# Patient Record
Sex: FEMALE | Race: BLACK OR AFRICAN AMERICAN | NOT HISPANIC OR LATINO | URBAN - METROPOLITAN AREA
[De-identification: names, ages, dates, MRNs, and addresses within clinical notes are randomized per-mention and may not be internally consistent; named-entity substitution may affect disease eponyms.]

---

## 2024-03-21 ENCOUNTER — TELEPHONE (OUTPATIENT)
Dept: ORTHOPEDICS | Facility: CLINIC | Age: 54
End: 2024-03-21
Payer: COMMERCIAL

## 2024-03-22 NOTE — TELEPHONE ENCOUNTER
DIAGNOSIS: Mass of right side of neck R22.1 US Neck/Head Soft Tissue Not Thyroid   APPOINTMENT DATE: 03/25/2024   NOTES STATUS DETAILS   OFFICE NOTE from referring provider Care Everywhere 03/11/2024 - Maryanne Velásquez  Physician Assistant -    ULTRASOUND PACS HP:  03/15/2024-  Head/Neck Soft Tissue   MRI PACS HP:  03/18/2024 - Chest

## 2024-03-22 NOTE — PROGRESS NOTES
[facility] Physicians, Orthopaedic Oncology Surgery Consultation  by Ubaldo Dominguez M.D.    Vilma Mendoza MRN# [id number]    YOB: 1970     Requesting physician: Maryanne Velásquez  [facility]            Assessment and Plan:   Assessment:  Mass deep to trapezius muscle on the right shoulder at the base of neck and apex of the thorax measuring 5 x 4 x 3 cm.  Differential diagnosis would include soft tissue hemangioma or vascular lesion or possibly sarcoma.  It does not have the appearance of a schwannoma or lipoma or desmoid.     Plan:  Advised proceeding with core needle biopsy.    Procedure note:  Under sterile precautions using ultrasound guidance, the mass was identified.  Color Doppler was used to assess vascularity.  Incision made through the trapezius and a 14-gauge biopsy instrument was passed into the mass multiple times to obtain core tissue samples which were sent to pathology.  There were no complications.    Ubaldo Dominguez MD MaFormerly [facility], [facility] Family Professor  Oncology and Adult Reconstructive Surgery  Dept Orthopaedic Surgery, [facility]  [phone] office, [phone] pager  [email or web address]             History of Present Illness:   54 year old female  chief complaint    This patient noted the presence of a mass involving her right neck and shoulder region for approximately 3 years and sought medical evaluation.  It does vary in size on occasion.  She has some discomfort but it does not awaken her from sleep.  No prior surgery or injury to the area.  She is otherwise healthy.    Current symptoms:  Problem: Right shoulder mass   Onset and duration: States it has been there for a really long time and thought it was a knot. About 3 weeks ago she found it was a mass   Awakens from sleep due to sx's:  No  Precipitating Injury:  No    Other joints or sites painful:  Yes, She cut her finger a few weeks ago   Fever: No  Appetite change or weight loss: No  History of  "prior or existing cancer: No                 Physical Exam:     EXAMINATION pertinent findings:   PSYCH: Pleasant, healthy-appearing, alert, oriented x3, cooperative. Normal mood and affect.  VITAL SIGNS: Height 1.626 m (5' 4.02\"), weight 116.6 kg (257 lb)..  Reviewed nursing intake notes.   Body mass index is 44.09 kg/m .  RESP: non labored breathing   ABD: benign, soft, non-tender, no acute peritoneal findings  SKIN: grossly normal   LYMPHATIC: grossly normal, no adenopathy, no extremity edema  NEURO: grossly normal , no motor deficits  VASCULAR: satisfactory perfusion of all extremities   MUSCULOSKELETAL:   Full range of motion possible of the right glenohumeral joint.  No radicular symptoms within the right upper extremity or neural logic or muscular dysfunction.  She has palpable mass deep to the trapezius muscle at the base of the neck, it is mobile.  Firm.  Nonpulsatile.  Negative Tinel's sign.           Data:   All laboratory data reviewed  All imaging studies reviewed by me          MRI:  03/19/2024 1:43 PM CDT   COMPARISON: 03/15/2024 ultrasound    TECHNIQUE: Multiplanar multisequence MRI of the right superior chest walls and right base of neck performed after the uncomplicated administration of 11 mL intravenous Gadavist.    FINDINGS: Study is optimized to evaluate soft tissue masses. Markers were placed by the patient overlying the region of interest and can be seen on axial images 16 through 19.    In the region of interest in the soft tissues overlying the right lung apex is a relatively well-circumscribed lobulated mass of intermediate T1, heterogeneously increased T2 signal with mild irregular internal enhancement measuring approximately 5.5 x 3.1 cm axially by 4.0 cm superior to inferior. Mass appears intramuscular within the levator scapulae muscle overlying the superior and posterior superior aspects of the right first and second ribs. Mass is in close proximity to some of the branches of the " brachial plexus. The visualized brachial plexus is otherwise unremarkable. There is marginating fat signal surrounding the mass with possible intrasubstance extension of fatty signal particularly along the superior margin of the mass. Mass is deep to the trapezius muscle. Mass does not appear to be infiltrative or show other invasion of other adjacent structures. No definite intrathoracic extension is identified. No marginating inflammatory changes. Visualized osseous structures are  unremarkable. No visualized marginating lymphadenopathy. Remainder of the visualized soft tissues are unremarkable. No other similar masses are identified.    IMPRESSION:  1. 5.5 x 3.1 x 4.0 cm heterogeneous lobulated intramuscular enhancing mass in the base of the right neck overlying the right lung apex is nonspecific and may be seen with either benign or malignant masses. Mass may represent a slow flow vascular malformation or intramuscular hemangioma. A neurogenic mass such as a peripheral nerve sheath tumor or plexiform neurofibroma is felt to be less likely but cannot be excluded. Orthopedic oncology surgical referral is recommended for possible surgical excision.         DATA for DOCUMENTATION:         Past Medical History:   There is no problem list on file for this patient.    No past medical history on file.    Also see scanned health assessment forms.       Past Surgical History:   No past surgical history on file.         Social History:     Social History     Socioeconomic History     Marital status:      Spouse name: Not on file     Number of children: Not on file     Years of education: Not on file     Highest education level: Not on file   Occupational History     Not on file   Tobacco Use     Smoking status: Not on file     Smokeless tobacco: Not on file   Substance and Sexual Activity     Alcohol use: Not on file     Drug use: Not on file     Sexual activity: Not on file   Other Topics Concern     Not on file    Social History Narrative     Not on file     Social Determinants of Health     Financial Resource Strain: Not on file   Food Insecurity: Not on file   Transportation Needs: Not on file   Physical Activity: Not on file   Stress: Not on file   Social Connections: Not on file   Interpersonal Safety: Not on file   Housing Stability: Not on file            Family History:     No family history on file.         Medications:     Current Outpatient Medications   Medication Sig     phentermine (ADIPEX-P) 37.5 MG tablet TAKE 0.5-1 TABLETS (18.75-37.5 MG) BY MOUTH DAILY.     ZEPBOUND 2.5 MG/0.5ML prefilled pen Inject 2.5 mg Subcutaneous     No current facility-administered medications for this visit.              Review of Systems:   A comprehensive 10 point review of systems (constitutional, ENT, cardiac, peripheral vascular, lymphatic, respiratory, GI, , Musculoskeletal, skin, Neurological) was performed and found to be negative except as described in this note.     See intake form completed by patient    Under 1% lidocane topical anesthesia, a Core Needle Biopsy of the above mentioned mass was sampled.  There were no complications. A sterile dressing was applied.

## 2024-03-25 ENCOUNTER — OFFICE VISIT (OUTPATIENT)
Dept: ORTHOPEDICS | Facility: CLINIC | Age: 54
End: 2024-03-25
Payer: COMMERCIAL

## 2024-03-25 ENCOUNTER — PRE VISIT (OUTPATIENT)
Dept: ORTHOPEDICS | Facility: CLINIC | Age: 54
End: 2024-03-25

## 2024-03-25 VITALS — WEIGHT: 257 LBS | BODY MASS INDEX: 43.87 KG/M2 | HEIGHT: 64 IN

## 2024-03-25 DIAGNOSIS — R22.9 LOCALIZED SWELLING, MASS AND LUMP, UNSPECIFIED: ICD-10-CM

## 2024-03-25 DIAGNOSIS — M79.89 SOFT TISSUE MASS: Primary | ICD-10-CM

## 2024-03-25 PROCEDURE — 88307 TISSUE EXAM BY PATHOLOGIST: CPT | Mod: 26 | Performed by: PATHOLOGY

## 2024-03-25 PROCEDURE — 99204 OFFICE O/P NEW MOD 45 MIN: CPT | Mod: 25 | Performed by: ORTHOPAEDIC SURGERY

## 2024-03-25 PROCEDURE — 20206 BIOPSY MUSCLE PERQ NEEDLE: CPT | Mod: RT | Performed by: ORTHOPAEDIC SURGERY

## 2024-03-25 PROCEDURE — 88307 TISSUE EXAM BY PATHOLOGIST: CPT | Mod: TC | Performed by: ORTHOPAEDIC SURGERY

## 2024-03-25 RX ORDER — PHENTERMINE HYDROCHLORIDE 37.5 MG/1
TABLET ORAL
COMMUNITY
Start: 2024-01-17

## 2024-03-25 RX ORDER — TIRZEPATIDE 2.5 MG/.5ML
2.5 INJECTION, SOLUTION SUBCUTANEOUS
COMMUNITY
Start: 2024-03-01

## 2024-03-25 NOTE — LETTER
3/25/2024         RE: Vilma Mendoza  [address]  [place] [address]        Dear Colleague,    Thank you for referring your patient, Vilma Mendoza, to the [facility] [place]. Please see a copy of my visit note below.        [facility] Physicians, Orthopaedic Oncology Surgery Consultation  by Ubaldo Dominguez M.D.    Vilma Mendoza MRN# [id number]    YOB: 1970     Requesting physician: Maryanne Velásquez  [facility], [facility]            Assessment and Plan:   Assessment:  Mass deep to trapezius muscle on the right shoulder at the base of neck and apex of the thorax measuring 5 x 4 x 3 cm.  Differential diagnosis would include soft tissue hemangioma or vascular lesion or possibly sarcoma.  It does not have the appearance of a schwannoma or lipoma or desmoid.     Plan:  Advised proceeding with core needle biopsy.    Procedure note:  Under sterile precautions using ultrasound guidance, the mass was identified.  Color Doppler was used to assess vascularity.  Incision made through the trapezius and a 14-gauge biopsy instrument was passed into the mass multiple times to obtain core tissue samples which were sent to pathology.  There were no complications.    Ubaldo Dominguez MD MaFormerly [facility] Family Professor  Oncology and Adult Reconstructive Surgery  Dept Orthopaedic Surgery, [facility]  [phone] office, [phone] pager  [email or web address]             History of Present Illness:   54 year old female  chief complaint    This patient noted the presence of a mass involving her right neck and shoulder region for approximately 3 years and sought medical evaluation.  It does vary in size on occasion.  She has some discomfort but it does not awaken her from sleep.  No prior surgery or injury to the area.  She is otherwise healthy.    Current symptoms:  Problem: Right shoulder mass   Onset and duration: States it has been there for a really long time and  "thought it was a knot. About 3 weeks ago she found it was a mass   Awakens from sleep due to sx's:  No  Precipitating Injury:  No    Other joints or sites painful:  Yes, She cut her finger a few weeks ago   Fever: No  Appetite change or weight loss: No  History of prior or existing cancer: No                 Physical Exam:     EXAMINATION pertinent findings:   PSYCH: Pleasant, healthy-appearing, alert, oriented x3, cooperative. Normal mood and affect.  VITAL SIGNS: Height 1.626 m (5' 4.02\"), weight 116.6 kg (257 lb)..  Reviewed nursing intake notes.   Body mass index is 44.09 kg/m .  RESP: non labored breathing   ABD: benign, soft, non-tender, no acute peritoneal findings  SKIN: grossly normal   LYMPHATIC: grossly normal, no adenopathy, no extremity edema  NEURO: grossly normal , no motor deficits  VASCULAR: satisfactory perfusion of all extremities   MUSCULOSKELETAL:   Full range of motion possible of the right glenohumeral joint.  No radicular symptoms within the right upper extremity or neural logic or muscular dysfunction.  She has palpable mass deep to the trapezius muscle at the base of the neck, it is mobile.  Firm.  Nonpulsatile.  Negative Tinel's sign.           Data:   All laboratory data reviewed  All imaging studies reviewed by me          MRI:  03/19/2024 1:43 PM CDT   COMPARISON: 03/15/2024 ultrasound    TECHNIQUE: Multiplanar multisequence MRI of the right superior chest walls and right base of neck performed after the uncomplicated administration of 11 mL intravenous Gadavist.    FINDINGS: Study is optimized to evaluate soft tissue masses. Markers were placed by the patient overlying the region of interest and can be seen on axial images 16 through 19.    In the region of interest in the soft tissues overlying the right lung apex is a relatively well-circumscribed lobulated mass of intermediate T1, heterogeneously increased T2 signal with mild irregular internal enhancement measuring approximately " 5.5 x 3.1 cm axially by 4.0 cm superior to inferior. Mass appears intramuscular within the levator scapulae muscle overlying the superior and posterior superior aspects of the right first and second ribs. Mass is in close proximity to some of the branches of the brachial plexus. The visualized brachial plexus is otherwise unremarkable. There is marginating fat signal surrounding the mass with possible intrasubstance extension of fatty signal particularly along the superior margin of the mass. Mass is deep to the trapezius muscle. Mass does not appear to be infiltrative or show other invasion of other adjacent structures. No definite intrathoracic extension is identified. No marginating inflammatory changes. Visualized osseous structures are  unremarkable. No visualized marginating lymphadenopathy. Remainder of the visualized soft tissues are unremarkable. No other similar masses are identified.    IMPRESSION:  1. 5.5 x 3.1 x 4.0 cm heterogeneous lobulated intramuscular enhancing mass in the base of the right neck overlying the right lung apex is nonspecific and may be seen with either benign or malignant masses. Mass may represent a slow flow vascular malformation or intramuscular hemangioma. A neurogenic mass such as a peripheral nerve sheath tumor or plexiform neurofibroma is felt to be less likely but cannot be excluded. Orthopedic oncology surgical referral is recommended for possible surgical excision.         DATA for DOCUMENTATION:         Past Medical History:   There is no problem list on file for this patient.    No past medical history on file.    Also see scanned health assessment forms.       Past Surgical History:   No past surgical history on file.         Social History:     Social History     Socioeconomic History    Marital status:      Spouse name: Not on file    Number of children: Not on file    Years of education: Not on file    Highest education level: Not on file   Occupational History     Not on file   Tobacco Use    Smoking status: Not on file    Smokeless tobacco: Not on file   Substance and Sexual Activity    Alcohol use: Not on file    Drug use: Not on file    Sexual activity: Not on file   Other Topics Concern    Not on file   Social History Narrative    Not on file     Social Determinants of Health     Financial Resource Strain: Not on file   Food Insecurity: Not on file   Transportation Needs: Not on file   Physical Activity: Not on file   Stress: Not on file   Social Connections: Not on file   Interpersonal Safety: Not on file   Housing Stability: Not on file            Family History:     No family history on file.         Medications:     Current Outpatient Medications   Medication Sig    phentermine (ADIPEX-P) 37.5 MG tablet TAKE 0.5-1 TABLETS (18.75-37.5 MG) BY MOUTH DAILY.    ZEPBOUND 2.5 MG/0.5ML prefilled pen Inject 2.5 mg Subcutaneous     No current facility-administered medications for this visit.              Review of Systems:   A comprehensive 10 point review of systems (constitutional, ENT, cardiac, peripheral vascular, lymphatic, respiratory, GI, , Musculoskeletal, skin, Neurological) was performed and found to be negative except as described in this note.     See intake form completed by patient    Under 1% lidocane topical anesthesia, a Core Needle Biopsy of the above mentioned mass was sampled.  There were no complications. A sterile dressing was applied.

## 2024-03-26 LAB
PATH REPORT.COMMENTS IMP SPEC: NORMAL
PATH REPORT.COMMENTS IMP SPEC: NORMAL
PATH REPORT.FINAL DX SPEC: NORMAL
PATH REPORT.GROSS SPEC: NORMAL
PATH REPORT.MICROSCOPIC SPEC OTHER STN: NORMAL
PATH REPORT.RELEVANT HX SPEC: NORMAL
PHOTO IMAGE: NORMAL

## 2024-04-05 NOTE — PROGRESS NOTES
[facility] Physicians, Orthopaedic Oncology Surgery Consultation  by Ubaldo Dominguez M.D.    Vilma Mendoza MRN# [id number]    YOB: 1970     Requesting physician: Mayranne Velásquez  [facility], [facility]       Diagnosis:  1.  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    Procedures:  1.  3/25/2024, core needle biopsy of right shoulder/neck mass ([place]) [facility]    I reviewed the biopsy results which reveal evidence of benign met vascular malformation.  There is no evidence of malignancy present.  The likelihood of targeting error is very small.  Therefore, management would consist of either serial observation or proceeding with surgical excision.  We discussed the pros and cons and risk benefits alternatives with each treatment strategy.    My recommendation, given the patient's minimal symptoms and the anatomic location was to proceed with serial observation.  Patient does note that the mass does sometimes vary in size with activity.  Therefore I advised her to always assess the tumor size and presence at the same time repetitively, perhaps early in the morning after arising from bed.  Patient is in favor of avoiding surgical resection if possible.    Plan, return in 6 months with repeat MRI before hand.  If patient develops symptoms, progressive pain or increasing size, she will contact us for a sooner appointment.      MD Izabella Gonzalez Family Professor  Oncology and Adult Reconstructive Surgery  Dept Orthopaedic Surgery, [facility]  [phone] office, [phone] pager  [email or web address]      Total combined visit time and work time before and after clinic visit on encounter date = 20 min

## 2024-04-08 ENCOUNTER — OFFICE VISIT (OUTPATIENT)
Dept: ORTHOPEDICS | Facility: CLINIC | Age: 54
End: 2024-04-08
Payer: COMMERCIAL

## 2024-04-08 ENCOUNTER — CARE COORDINATION (OUTPATIENT)
Dept: ORTHOPEDICS | Facility: CLINIC | Age: 54
End: 2024-04-08

## 2024-04-08 DIAGNOSIS — M79.89 SOFT TISSUE MASS: Primary | ICD-10-CM

## 2024-04-08 DIAGNOSIS — R22.9 LOCALIZED SWELLING, MASS AND LUMP, UNSPECIFIED: ICD-10-CM

## 2024-04-08 DIAGNOSIS — F40.240 CLAUSTROPHOBIA: Primary | ICD-10-CM

## 2024-04-08 PROCEDURE — 99213 OFFICE O/P EST LOW 20 MIN: CPT | Performed by: ORTHOPAEDIC SURGERY

## 2024-04-08 RX ORDER — DIAZEPAM 5 MG
TABLET ORAL
Qty: 1 TABLET | Refills: 0 | Status: SHIPPED | OUTPATIENT
Start: 2024-11-06

## 2024-04-08 NOTE — LETTER
4/8/2024       RE: Vilma Mendoza  [address]  [place] [address]     Dear Colleague,    Thank you for referring your patient, Vilma Mendoza, to the [facility] [place] [facility]. Please see a copy of my visit note below.        [facility] Physicians, Orthopaedic Oncology Surgery Consultation  by Ubaldo Dominguez M.D.    Vilma Mendoza MRN# [id number]    YOB: 1970     Requesting physician: Maryanne Velásquez  [facility], [facility]       Diagnosis:  1.  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    Procedures:  1.  3/25/2024, core needle biopsy of right shoulder/neck mass ([place]) [facility]    I reviewed the biopsy results which reveal evidence of benign met vascular malformation.  There is no evidence of malignancy present.  The likelihood of targeting error is very small.  Therefore, management would consist of either serial observation or proceeding with surgical excision.  We discussed the pros and cons and risk benefits alternatives with each treatment strategy.    My recommendation, given the patient's minimal symptoms and the anatomic location was to proceed with serial observation.  Patient does note that the mass does sometimes vary in size with activity.  Therefore I advised her to always assess the tumor size and presence at the same time repetitively, perhaps early in the morning after arising from bed.  Patient is in favor of avoiding surgical resection if possible.    Plan, return in 6 months with repeat MRI before hand.  If patient develops symptoms, progressive pain or increasing size, she will contact us for a sooner appointment.      MD Izabella Gonzalez Family Professor  Oncology and Adult Reconstructive Surgery  Dept Orthopaedic Surgery, [facility]  [phone] office, [phone]  pager  [email or web address]      Total combined visit time and work time before and after clinic visit on encounter date = 20 min      Again, thank you for allowing me to participate in the care of your patient.      Sincerely,    Ubaldo Dominguez MD

## 2024-11-19 ENCOUNTER — MYC MEDICAL ADVICE (OUTPATIENT)
Dept: ORTHOPEDICS | Facility: CLINIC | Age: 54
End: 2024-11-19
Payer: COMMERCIAL

## 2024-11-19 NOTE — TELEPHONE ENCOUNTER
VM left requesting a return call to schedule MRI and follow up visit with Dr. Dominguez.    Leanna Colin LPN

## 2024-11-27 DIAGNOSIS — F40.240 CLAUSTROPHOBIA: Primary | ICD-10-CM

## 2024-11-27 RX ORDER — DIAZEPAM 5 MG/1
TABLET ORAL
Qty: 1 TABLET | Refills: 0 | Status: SHIPPED | OUTPATIENT
Start: 2024-11-27

## 2024-11-27 NOTE — TELEPHONE ENCOUNTER
[facility] Call Center    Phone Message    May a detailed message be left on voicemail: yes     Reason for Call: Other: Pt requesting a script for meds to take before her MRI as she is closterphobic. [facility] [address] [facility] [address]     Action Taken: Other: csc    Travel Screening: Not Applicable     Date of Service:

## 2024-11-30 ENCOUNTER — HEALTH MAINTENANCE LETTER (OUTPATIENT)
Age: 54
End: 2024-11-30

## 2024-12-11 ENCOUNTER — HOSPITAL ENCOUNTER (OUTPATIENT)
Dept: MRI IMAGING | Facility: CLINIC | Age: 54
Discharge: HOME | End: 2024-12-11
Attending: ORTHOPAEDIC SURGERY
Payer: COMMERCIAL

## 2024-12-11 DIAGNOSIS — M79.89 SOFT TISSUE MASS: ICD-10-CM

## 2024-12-11 DIAGNOSIS — R22.9 LOCALIZED SWELLING, MASS AND LUMP, UNSPECIFIED: ICD-10-CM

## 2024-12-11 PROCEDURE — 73223 MRI JOINT UPR EXTR W/O&W/DYE: CPT | Mod: 26 | Performed by: RADIOLOGY

## 2024-12-11 PROCEDURE — 73223 MRI JOINT UPR EXTR W/O&W/DYE: CPT | Mod: RT

## 2024-12-11 PROCEDURE — A9585 GADOBUTROL INJECTION: HCPCS | Performed by: ORTHOPAEDIC SURGERY

## 2024-12-11 PROCEDURE — 255N000002 HC RX 255 OP 636: Performed by: ORTHOPAEDIC SURGERY

## 2024-12-11 RX ORDER — GADOBUTROL 604.72 MG/ML
11 INJECTION INTRAVENOUS ONCE
Status: COMPLETED | OUTPATIENT
Start: 2024-12-11 | End: 2024-12-11

## 2024-12-11 RX ADMIN — GADOBUTROL 11 ML: 604.72 INJECTION INTRAVENOUS at 21:36

## 2024-12-19 NOTE — PROGRESS NOTES
"    [facility] Physicians, Orthopaedic Oncology Surgery Consultation  by Ubaldo Dominguez M.D.    Vilma Mendoza MRN# [id number]    YOB: 1970     Requesting physician: Maryanne Velásquez PA-C [facility]  [facility], [facility]       Diagnosis:  1.  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    Procedures:  1.  3/25/2024, core needle biopsy of right shoulder/neck mass ([place]berto) [facility]      I saw Vilma today for the follow-up of the vascular malformation of her right base of neck trapezial area.  She notes that she has no symptoms of discomfort and she does not think the mass is changed in size at all since her last visit.  There is a known history of this mass being present for at least 5 years or longer per patient.  It was thought to be a \"knot\" within her muscle tissue until her chiropractic physician identified a solid tumor based upon imaging studies performed.    Examination confirms palpable mobile mass within the subtrapezial area at the base of the neck protruding towards the supraclavicular area.  Nontender, no warmth, no erythema or fluctuance.    MRI examination is obtained and compared to previous study comparing March 2024 to December 2024 there is no appreciable change in size or appearance of the mass.        Impression:  Stable appearance to biopsy-proven benign vascular lesion of right base of neck.  Patient asymptomatic.      Recommendations:  Patient would like to forego any surgical excision unless she is symptomatic.  I would concur with patient and see no medical urgency or need for intervention at the present time.  Patient could be released on her own surveillance however she requested additional MRI study and 1 year to confirm absence of enlargement over time.      Ubaldo Dominguez MD  [facility] Family Professor  Oncology and Adult Reconstructive Surgery  Dept " Orthopaedic Surgery, [facility]  [phone] office, [phone] pager  [email or web address]    Total combined visit time and work time before and after clinic visit, independent of trainee, on encounter date = 20 min

## 2024-12-23 ENCOUNTER — OFFICE VISIT (OUTPATIENT)
Dept: ORTHOPEDICS | Facility: CLINIC | Age: 54
End: 2024-12-23
Payer: COMMERCIAL

## 2024-12-23 DIAGNOSIS — M79.89 SOFT TISSUE MASS: Primary | ICD-10-CM

## 2024-12-23 DIAGNOSIS — Q27.9 VASCULAR MALFORMATION: ICD-10-CM

## 2024-12-23 DIAGNOSIS — R22.9 LOCALIZED SWELLING, MASS AND LUMP, UNSPECIFIED: ICD-10-CM

## 2024-12-23 PROCEDURE — 99213 OFFICE O/P EST LOW 20 MIN: CPT | Performed by: ORTHOPAEDIC SURGERY

## 2024-12-23 NOTE — LETTER
"12/23/2024       RE: Vilma Mendoza  [address] [facility] [facility] [address]     Dear Colleague,    Thank you for referring your patient, Vilma Mendoza, to the [facility] [place] at [facility]. Please see a copy of my visit note below.        [facility], Orthopaedic Oncology Surgery Consultation  by Ubaldo Dominguez M.D.    Vilma Mendoza MRN# [id number]    YOB: 1970     Requesting physician: Maryanne Velásquez PA-C [facility]  [facility] hospital[facility]ractic  [facility], [facility] [place]       Diagnosis:  1.  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    Procedures:  1.  3/25/2024, core needle biopsy of right shoulder/neck mass ([place]) [facility]      I saw Vilma today for the follow-up of the vascular malformation of her right base of neck trapezial area.  She notes that she has no symptoms of discomfort and she does not think the mass is changed in size at all since her last visit.  There is a known history of this mass being present for at least 5 years or longer per patient.  It was thought to be a \"knot\" within her muscle tissue until her chiropractic physician identified a solid tumor based upon imaging studies performed.    Examination confirms palpable mobile mass within the subtrapezial area at the base of the neck protruding towards the supraclavicular area.  Nontender, no warmth, no erythema or fluctuance.    MRI examination is obtained and compared to previous study comparing March 2024 to December 2024 there is no appreciable change in size or appearance of the mass.        Impression:  Stable appearance to biopsy-proven benign vascular lesion of right base of neck.  Patient asymptomatic.      Recommendations:  Patient would like to forego any surgical excision unless she is symptomatic.  I would concur with patient and see no " medical urgency or need for intervention at the present time.  Patient could be released on her own surveillance however she requested additional MRI study and 1 year to confirm absence of enlargement over time.      Ubaldo Dominguez MD  [facility] Family Professor  Oncology and Adult Reconstructive Surgery  Dept Orthopaedic Surgery, [facility]  [phone] office, [phone] pager  [email or web address]    Total combined visit time and work time before and after clinic visit, independent of trainee, on encounter date = 20 min        Again, thank you for allowing me to participate in the care of your patient.      Sincerely,    Ubaldo Dominguez MD

## 2024-12-23 NOTE — LETTER
"12/23/2024      Vilma Mendoza  [address]  [facility] [address]      Dear Colleague,    Thank you for referring your patient, Vilma Mendoza, to the [facility] [place]. Please see a copy of my visit note below.        [facility] Physicians, Orthopaedic Oncology Surgery Consultation  by Ubaldo Dominguez M.D.    Vilma Mendoza MRN# [id number]    YOB: 1970     Requesting physician: Maryanne Velásquez  [facility], [facility] [place]       Diagnosis:  1.  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    Procedures:  1.  3/25/2024, core needle biopsy of right shoulder/neck mass ([place]) [facility]      I saw Kushal today for the follow-up of the vascular malformation of her right base of neck trapezial area.  She notes that she has no symptoms of discomfort and she does not think the mass is changed in size at all since her last visit.  There is a known history of this mass being present for at least 5 years or longer per patient.  It was thought to be a \"knot\" within her muscle tissue until her chiropractic physician identified a solid tumor based upon imaging studies performed.    Examination confirms palpable mobile mass within the subtrapezial area at the base of the neck protruding towards the supraclavicular area.  Nontender, no warmth, no erythema or fluctuance.    MRI examination is obtained and compared to previous study comparing March 2024 to December 2024 there is no appreciable change in size or appearance of the mass.    Impression:  Stable appearance to biopsy-proven benign vascular lesion of right base of neck.  Patient asymptomatic.      Recommendations:  Patient would like to forego any surgical excision unless she is symptomatic.  I would concur with patient and see no medical urgency or need for intervention at the present time.  Patient could be released on her own surveillance however she " requested additional MRI study and 1 year to confirm absence of enlargement over time.      MD Izabella Gonzalez Family Professor  Oncology and Adult Reconstructive Surgery  Dept Orthopaedic Surgery, [facility]  [phone] office, [phone] pager  [email or web address]    Total combined visit time and work time before and after clinic visit, independent of trainee, on encounter date = 20 min        Again, thank you for allowing me to participate in the care of your patient.        Sincerely,        Ubaldo Dominguez MD

## (undated) RX ORDER — LIDOCAINE HYDROCHLORIDE 10 MG/ML
INJECTION, SOLUTION INFILTRATION; PERINEURAL
Status: DISPENSED
Start: 2024-03-25